# Patient Record
Sex: MALE | Race: WHITE | NOT HISPANIC OR LATINO | Employment: OTHER | URBAN - METROPOLITAN AREA
[De-identification: names, ages, dates, MRNs, and addresses within clinical notes are randomized per-mention and may not be internally consistent; named-entity substitution may affect disease eponyms.]

---

## 2017-06-28 ENCOUNTER — CLINICAL SUPPORT (OUTPATIENT)
Dept: SMOKING CESSATION | Facility: CLINIC | Age: 67
End: 2017-06-28
Payer: COMMERCIAL

## 2017-06-28 DIAGNOSIS — F17.200 NICOTINE DEPENDENCE: Primary | ICD-10-CM

## 2017-06-28 PROCEDURE — 99407 BEHAV CHNG SMOKING > 10 MIN: CPT | Mod: S$GLB,,,

## 2017-06-28 NOTE — PROGRESS NOTES
Pt not ready to return to the program at this time. Pt will call to schedule when ready. Will resolve this episode.

## 2019-07-24 ENCOUNTER — OFFICE VISIT (OUTPATIENT)
Dept: FAMILY MEDICINE | Facility: CLINIC | Age: 69
End: 2019-07-24
Payer: MEDICARE

## 2019-07-24 VITALS
OXYGEN SATURATION: 97 % | WEIGHT: 155.31 LBS | TEMPERATURE: 98 F | RESPIRATION RATE: 18 BRPM | BODY MASS INDEX: 21.06 KG/M2 | HEART RATE: 78 BPM

## 2019-07-24 DIAGNOSIS — F41.9 ANXIETY: ICD-10-CM

## 2019-07-24 DIAGNOSIS — G47.00 INSOMNIA, UNSPECIFIED TYPE: ICD-10-CM

## 2019-07-24 DIAGNOSIS — K21.9 GASTROESOPHAGEAL REFLUX DISEASE WITHOUT ESOPHAGITIS: Primary | ICD-10-CM

## 2019-07-24 DIAGNOSIS — F32.A DEPRESSION, UNSPECIFIED DEPRESSION TYPE: ICD-10-CM

## 2019-07-24 PROCEDURE — 99999 PR PBB SHADOW E&M-EST. PATIENT-LVL III: ICD-10-PCS | Mod: PBBFAC,,, | Performed by: INTERNAL MEDICINE

## 2019-07-24 PROCEDURE — 99203 PR OFFICE/OUTPT VISIT, NEW, LEVL III, 30-44 MIN: ICD-10-PCS | Mod: S$PBB,,, | Performed by: INTERNAL MEDICINE

## 2019-07-24 PROCEDURE — 99203 OFFICE O/P NEW LOW 30 MIN: CPT | Mod: S$PBB,,, | Performed by: INTERNAL MEDICINE

## 2019-07-24 PROCEDURE — 99213 OFFICE O/P EST LOW 20 MIN: CPT | Mod: PBBFAC,PO | Performed by: INTERNAL MEDICINE

## 2019-07-24 PROCEDURE — 99999 PR PBB SHADOW E&M-EST. PATIENT-LVL III: CPT | Mod: PBBFAC,,, | Performed by: INTERNAL MEDICINE

## 2019-07-24 RX ORDER — PIOGLITAZONE HCL AND METFORMIN HCL 850; 15 MG/1; MG/1
TABLET ORAL
COMMUNITY
Start: 2019-05-31 | End: 2019-07-24 | Stop reason: SDUPTHER

## 2019-07-24 RX ORDER — METOPROLOL SUCCINATE 25 MG/1
25 TABLET, EXTENDED RELEASE ORAL
COMMUNITY
Start: 2019-05-28

## 2019-07-24 RX ORDER — PANTOPRAZOLE SODIUM 40 MG/1
40 TABLET, DELAYED RELEASE ORAL
COMMUNITY
Start: 2019-05-28

## 2019-07-24 RX ORDER — ASPIRIN 81 MG/1
81 TABLET ORAL
COMMUNITY

## 2019-07-24 RX ORDER — ALPRAZOLAM 0.5 MG/1
0.5 TABLET ORAL 3 TIMES DAILY PRN
COMMUNITY
Start: 2019-06-17 | End: 2019-07-24

## 2019-07-24 RX ORDER — PIOGLITAZONE HCL AND METFORMIN HCL 850; 15 MG/1; MG/1
1 TABLET ORAL
COMMUNITY
Start: 2019-05-28 | End: 2020-05-27

## 2019-07-24 RX ORDER — SIMVASTATIN 40 MG/1
40 TABLET, FILM COATED ORAL
COMMUNITY
Start: 2019-05-28

## 2019-07-24 NOTE — PROGRESS NOTES
Subjective:       Patient ID: Isaak Trinh is a 69 y.o. male.    Chief Complaint: Establish Care and Insomnia    Sees pcp dr guerra.         Wants 2nd opinion about his insomnia.        Was taking xanax for this but his pcp will no longer give this or ambien to him.    ambien gives him bad dreams.      multiple OTC meds no help----------   Still seeing his pcp.        Also c/o gas----------also just seen by his gi doc and has colon scheduled.                Past Medical History:   Diagnosis Date    Anxiety     Degenerative joint disease of spine     Depression     Diabetes mellitus, type 2     Hyperlipidemia     Neuromuscular disorder      Past Surgical History:   Procedure Laterality Date    torn miniscus       History reviewed. No pertinent family history.  Social History     Socioeconomic History    Marital status:      Spouse name: Not on file    Number of children: Not on file    Years of education: Not on file    Highest education level: Not on file   Occupational History    Not on file   Social Needs    Financial resource strain: Not on file    Food insecurity:     Worry: Not on file     Inability: Not on file    Transportation needs:     Medical: Not on file     Non-medical: Not on file   Tobacco Use    Smoking status: Current Every Day Smoker     Packs/day: 1.50     Years: 48.00     Pack years: 72.00   Substance and Sexual Activity    Alcohol use: Not on file    Drug use: Not on file    Sexual activity: Not on file   Lifestyle    Physical activity:     Days per week: Not on file     Minutes per session: Not on file    Stress: Not on file   Relationships    Social connections:     Talks on phone: Not on file     Gets together: Not on file     Attends Sikh service: Not on file     Active member of club or organization: Not on file     Attends meetings of clubs or organizations: Not on file     Relationship status: Not on file   Other Topics Concern    Not on file    Social History Narrative    Not on file     Review of Systems   Constitutional: Positive for fatigue. Negative for chills and fever.   Respiratory: Negative for apnea, cough, choking and wheezing.    Cardiovascular: Negative for chest pain and palpitations.   Gastrointestinal: Negative for abdominal pain.   Genitourinary: Negative for dysuria and hematuria.   Neurological: Negative for dizziness, light-headedness and headaches.   Psychiatric/Behavioral: Positive for dysphoric mood and self-injury. Negative for agitation, behavioral problems, confusion, sleep disturbance and suicidal ideas.       Objective:      Physical Exam   Constitutional: He is oriented to person, place, and time. He appears well-developed and well-nourished.   Cardiovascular: Normal rate, regular rhythm, normal heart sounds and intact distal pulses.   Pulmonary/Chest: Effort normal and breath sounds normal.   Abdominal: Soft. Bowel sounds are normal.   Musculoskeletal: Normal range of motion.   Neurological: He is alert and oriented to person, place, and time.   Psychiatric: He has a normal mood and affect. His behavior is normal.   Nursing note and vitals reviewed.      CMP  No results found for: NA, K, CL, CO2, GLU, BUN, CREATININE, CALCIUM, PROT, ALBUMIN, BILITOT, ALKPHOS, AST, ALT, ANIONGAP, ESTGFRAFRICA, EGFRNONAA  No results found for: WBC, HGB, HCT, MCV, PLT  No results found for: CHOL  No results found for: HDL  No results found for: LDLCALC  No results found for: TRIG  No results found for: CHOLHDL  No results found for: TSH  No results found for: LABA1C, HGBA1C  Assessment:       1. Gastroesophageal reflux disease without esophagitis    2. Insomnia, unspecified type    3. Depression, unspecified depression type    4. Anxiety        Plan:   Gastroesophageal reflux disease without esophagitis---------------continue protonix-------------------f/u with gi----------    Insomnia, unspecified type    Depression, unspecified depression  type    Anxiety    Recommend he see psychiatry in consult---------states he knows one that he will see.              He will continue to f/u with dr gurera, his pcp/